# Patient Record
Sex: FEMALE | Race: WHITE | Employment: UNEMPLOYED | URBAN - METROPOLITAN AREA
[De-identification: names, ages, dates, MRNs, and addresses within clinical notes are randomized per-mention and may not be internally consistent; named-entity substitution may affect disease eponyms.]

---

## 2022-06-09 ENCOUNTER — OFFICE VISIT (OUTPATIENT)
Dept: ORTHOPEDIC SURGERY | Age: 22
End: 2022-06-09
Payer: OTHER GOVERNMENT

## 2022-06-09 ENCOUNTER — TELEPHONE (OUTPATIENT)
Dept: ORTHOPEDIC SURGERY | Age: 22
End: 2022-06-09

## 2022-06-09 VITALS
HEIGHT: 62 IN | WEIGHT: 173 LBS | RESPIRATION RATE: 16 BRPM | OXYGEN SATURATION: 98 % | HEART RATE: 100 BPM | BODY MASS INDEX: 31.83 KG/M2

## 2022-06-09 DIAGNOSIS — M76.892 HIP FLEXOR TENDINITIS, LEFT: Primary | ICD-10-CM

## 2022-06-09 DIAGNOSIS — M99.03 LUMBAR REGION SOMATIC DYSFUNCTION: ICD-10-CM

## 2022-06-09 DIAGNOSIS — G57.03 PIRIFORMIS SYNDROME OF BOTH SIDES: ICD-10-CM

## 2022-06-09 DIAGNOSIS — M76.31 ILIOTIBIAL BAND SYNDROME OF BOTH SIDES: ICD-10-CM

## 2022-06-09 DIAGNOSIS — M99.06 LOWER LIMB REGION SOMATIC DYSFUNCTION: ICD-10-CM

## 2022-06-09 DIAGNOSIS — M76.891 HIP FLEXOR TENDINITIS, RIGHT: ICD-10-CM

## 2022-06-09 DIAGNOSIS — M99.07 UPPER EXTREMITY SOMATIC DYSFUNCTION: ICD-10-CM

## 2022-06-09 DIAGNOSIS — M99.09 SOMATIC DYSFUNCTION OF ABDOMINAL REGION: ICD-10-CM

## 2022-06-09 DIAGNOSIS — M99.02 THORACIC REGION SOMATIC DYSFUNCTION: ICD-10-CM

## 2022-06-09 DIAGNOSIS — M99.05 PELVIC SOMATIC DYSFUNCTION: ICD-10-CM

## 2022-06-09 DIAGNOSIS — M99.01 CERVICAL SOMATIC DYSFUNCTION: ICD-10-CM

## 2022-06-09 DIAGNOSIS — M76.32 ILIOTIBIAL BAND SYNDROME OF BOTH SIDES: ICD-10-CM

## 2022-06-09 DIAGNOSIS — M99.08 RIB CAGE REGION SOMATIC DYSFUNCTION: ICD-10-CM

## 2022-06-09 DIAGNOSIS — M99.04 SACRAL REGION SOMATIC DYSFUNCTION: ICD-10-CM

## 2022-06-09 PROCEDURE — 98929 OSTEOPATH MANJ 9-10 REGIONS: CPT | Performed by: FAMILY MEDICINE

## 2022-06-09 PROCEDURE — 99204 OFFICE O/P NEW MOD 45 MIN: CPT | Performed by: FAMILY MEDICINE

## 2022-06-09 RX ORDER — SERTRALINE HYDROCHLORIDE 25 MG/1
TABLET, FILM COATED ORAL
COMMUNITY
Start: 2022-04-26

## 2022-06-09 RX ORDER — DICLOFENAC SODIUM 50 MG/1
50 TABLET, DELAYED RELEASE ORAL 2 TIMES DAILY WITH MEALS
Qty: 60 TABLET | Refills: 1 | Status: SHIPPED | OUTPATIENT
Start: 2022-06-09 | End: 2022-08-11 | Stop reason: SDUPTHER

## 2022-06-09 NOTE — TELEPHONE ENCOUNTER
Yohana from [employer] called stating an authorization is needed prior to being seen for physical therapy. Contact number [phone].

## 2022-06-09 NOTE — LETTER
6/9/2022    Patient: Julita Retana   YOB: 2000   Date of Visit: 6/9/2022     Pradip Ryan NP  [facility] [phone]  Via Fax: [phone]    Dear Pradip Ryan NP,      Thank you for referring Ms. Julita Retana to [place] [address]. for evaluation. My notes for this consultation are attached. If you have questions, please do not hesitate to call me. I look forward to following your patient along with you.       Sincerely,    Mirian Velasco, DO

## 2022-06-09 NOTE — LETTER
NOTIFICATION RETURN TO WORK / SCHOOL    6/9/2022 10:58 AM    [phone]      To Whom It May Concern:    Allyson Corona is currently under the care of [facility]..  Genaro Lucas her with her at [facility] today and he will return to work/school on: 6/9/2022. If there are questions or concerns please have the patient contact our office.         Sincerely,      Iker Bearden, DO

## 2022-06-09 NOTE — PROGRESS NOTES
HISTORY OF PRESENT ILLNESS    Denys Baeza 2000 is a 25y.o. year old female comes in today as new patient for: pain hips    Patients symptoms have been present for 2-3 months. Pain level 7/10 hips lateral to posterior low back. It has worsened with walk/stand long periods. Patient has tried:  Walking with cane and PT referral but not started yeat. Has also been stretch massage often. Tylenol, ibuprofen OTC no help. It is described as pain in hips ache, usually not sharp. No popping. IMAGING: MRI hips 5/10/2022 MRI-CT images reviewed, agree w/ findings, but doubt labral tears after exam:  1. Bilateral anterior superior acetabular labral tears. 2.  Mild subchondral sclerosis at the bilateral femoral heads, likely within normal limits. No evidence of osteonecrosis. Past Surgical History:   Procedure Laterality Date    HX APPENDECTOMY       Social History     Socioeconomic History    Marital status: SINGLE   Tobacco Use    Smoking status: Never Smoker    Smokeless tobacco: Never Used   Vaping Use    Vaping Use: Every day    Substances: Nicotine, Flavoring    Devices: Refillable tank   Substance and Sexual Activity    Alcohol use: Not Currently    Drug use: Yes     Types: Marijuana      Current Outpatient Medications   Medication Sig Dispense Refill    sertraline (ZOLOFT) 25 mg tablet        Past Medical History:   Diagnosis Date    Anxiety     Depression     Seizures ([address]      No family history on file. ROS:  No numb, tingle, incot, fever      Objective:  Pulse 100   Resp 16   Ht 5' 2\" (1.575 m)   Wt 173 lb (78.5 kg)   SpO2 98%   BMI 31.64 kg/m²   NEURO:  Sensation intact to light touch. Reflexes +2/4 patellar and Achilles bilaterally. M/S: Examined standing and supine. Slump negative. Standing flexion test positive right  Sphinx positive left.    ASIS low left  Iliac crests equal bilaterally Pubes equal bilaterally Medial malleolus low right  Sacral base posterior left ALLIE low left  TTA at C3 on left worse flexion, T2, 4, 5 on right worse flexion and L2, 4 on left worse flexion Rib(s) 2, 3, 4 leftTTP and posterior Thoracic diaphragm restricted right. Scapula motion restricted w/ TTA left. Hip flexion limited left. LE Strength +5/5 bilaterally Scour negative bilateral  ELENI negative bilateral .  Internal rotation normal. bilaterally  positive TTP over greater trochanter. Piriformis tender and tight left>right  Cindy's test positive bilateral  Flavio test positive for hip flexor and quad bilateral         Assessment/Plan:     ICD-10-CM ICD-9-CM    1. Hip flexor tendinitis, left  M76.892 727.09 REFERRAL TO PHYSICAL THERAPY      diclofenac EC (VOLTAREN) 50 mg EC tablet   2. Hip flexor tendinitis, right  M76.891 727.09 REFERRAL TO PHYSICAL THERAPY      diclofenac EC (VOLTAREN) 50 mg EC tablet   3. Iliotibial band syndrome of both sides  M76.31 728.89 REFERRAL TO PHYSICAL THERAPY    M76.32  diclofenac EC (VOLTAREN) 50 mg EC tablet   4. Piriformis syndrome of both sides  G57.03 355.0 REFERRAL TO PHYSICAL THERAPY      diclofenac EC (VOLTAREN) 50 mg EC tablet   5. Lumbar region somatic dysfunction  M99.03 739.3 NV OSTEOPATHIC MANIP,9-10 BODY REGN   6. Pelvic somatic dysfunction  M99.05 739.5 NV OSTEOPATHIC MANIP,9-10 BODY REGN   7. Sacral region somatic dysfunction  M99.04 739.4 NV OSTEOPATHIC MANIP,9-10 BODY REGN   8. Thoracic region somatic dysfunction  M99.02 739.2 NV OSTEOPATHIC MANIP,9-10 BODY REGN   9. Rib cage region somatic dysfunction  M99.08 739.8 NV OSTEOPATHIC MANIP,9-10 BODY REGN   10. Cervical somatic dysfunction  M99.01 739.1 NV OSTEOPATHIC MANIP,9-10 BODY REGN   11.  Upper extremity somatic dysfunction  M99.07 739.7 NV OSTEOPATHIC MANIP,9-10 BODY REGN   12. Lower limb region somatic dysfunction  M99.06 739.6 NV OSTEOPATHIC MANIP,9-10 BODY REGN   13. Somatic dysfunction of abdominal region  M99.09 739.9 NV OSTEOPATHIC MANIP,9-10 BODY REGN         Patient (or guardian if minor) verbalizes understanding of evaluation and plan. Verbal consent obtained. Cervical, Thoracic, Rib, Lumbar, Pelvic, Sacral, Upper Ext, Lower Ext and Abdominal SD treated with myofascial and ME. Correction of previous malalignments verified after Tx. Pt tolerated well. Notes improvement of Sx and pain is now rated 5/10. HEP/stretches daily. Discussed stretching/strengthening/posture. Will start HEP, PT and Rx for diclofenac 50mg as above and plan follow-up 4 weeks.  Highly doubt labral tears as exam no evidence

## 2022-06-16 ENCOUNTER — HOSPITAL ENCOUNTER (OUTPATIENT)
Dept: PHYSICAL THERAPY | Age: 22
Discharge: HOME | End: 2022-06-16
Payer: OTHER GOVERNMENT

## 2022-06-16 PROCEDURE — 97162 PT EVAL MOD COMPLEX 30 MIN: CPT

## 2022-06-16 NOTE — THERAPY EVALUATION
[address] [place] [address] PHYSICAL THERAPY  [address] [place] [address] Gwendolyn Bernal, [address] - Phone: [phone]  Fax: [phone] / [address] [facility]  Patient Name: Lyric Del Toro : 2000   Medical   Diagnosis: Left hip pain [M25.552]  Right hip pain [M25.551] Treatment Diagnosis: Bilateral hip labral tear   Onset Date: 3-4 months ago     Referral Source: Hossein Fuentes* Start of Care [facility]): 2022   Prior Hospitalization: See medical history Provider #: [id number]   Prior Level of Function: functionally independent    Comorbidities: Anxiety, depression, epilepsy, coccyx removal    Medications: Verified on Patient Summary List   The Plan of Care and following information is based on the information from the initial evaluation.   ===========================================================================================  Assessment / key information: Patient is a 25 y.o. female CC bilateral hip pain that wraps around to her lower back for the past 3-4 months. Pt states she has noticed hip discomfort for most of her life, but it was not bad enough to receive medical attention. Pt's MRI showed bilateral hip labral tears and CT scan revealed possible AVN. Six years ago, pt had her coccyx removed after multiple fractures. Pt reports she did not follow her post-surgical protocol and did not receive any type of rehabilitation, which is a possible contributor to pt's current hip dysfunction along with her advance activity level throughout high school. Throughout high school, pt participated in multiple sports including shot put, discus, and wrestling. Pt recenlty has been ambulating with a SPC PRN, specifically with ascending/descending stairs, due to the pain. Pt reports most difficulty with standing and walking (~15-20 minutes) and ascending/descneding stairs. Pt demonstrates ROM deficits (listed below).  Pt's strength is 5/5 bilaterally but painful. (Pt  will benefit from physical therapist management to address her impairments (listed below),  educate her, and improve her level of function. Thanks for your referral.     ROM/Strength                   AROM                 Strength (1-5)  Hip Left Right Left Right   Flexion 82 p! 75 p! 5/5 p! 5/5 p! Extension 26 p! 24 p! 5/5 p! 5/5 p! Abduction 60 p! 42 p! 5/5 p! 5/5 p! ER Supine 19 25 5/5 5/5   IR Supine 25 p! 31 p! 5/5 p! 5/5 p! ER Prone 35 43 5/5 5/5   IR Prone 53 p! 41 p! 5/5 p! 5/5 p!    Knee Left Right Left Right   Extension NT NT 5/5 5/5   Flexion NT NT 5/5 5/5       ===========================================================================================  Eval Complexity: History: MEDIUM  Complexity : 1-2 comorbidities / personal factors will impact the outcome/ POC Exam:HIGH Complexity : 4+ Standardized tests and measures addressing body structure, function, activity limitation and / or participation in recreation  Presentation: MEDIUM Complexity : Evolving with changing characteristics  Clinical Decision Making:MEDIUM Complexity : FOTO score of 26-74Overall Complexity:MEDIUM  Problem List: pain affecting function, decrease ROM, decrease strength, edema affecting function, impaired gait/ balance, decrease ADL/ functional abilitiies, decrease activity tolerance, decrease flexibility/ joint mobility and decrease transfer abilities  FOTO score: 59 indicating 41% functional disability  Treatment Plan may include any combination of the following: Therapeutic exercise, Therapeutic activities, Neuromuscular re-education, Physical agent/modality, Gait/balance training, Manual therapy, Patient education, Self Care training, Functional mobility training, Home safety training and Stair training  Patient / Family readiness to learn indicated by: asking questions, trying to perform skills and interest  Persons(s) to be included in education: patient (P)  Barriers to Learning/Limitations: None  Measures taken: n/a   Patient Goal (s): \"Find out what exactly is going on and solve it so I can go back to living a normal life. \"   Patient self reported health status: fair  Rehabilitation Potential: good   Short Term Goals: To be accomplished in  3-4  weeks:  1. Patient to be adherent to HEP to facilitate pain control with ADL's.  2. Patient to demonstrate pain-free bilateral AROM hip flexion to >/= 90 degrees to facilitate ease with squatting to lift objects. 3. Patient to demonstrate >/= 50% improvement in sleep tolerance uninterrupted by bilateral hip pain.  Long Term Goals: To be accomplished in  6-8  weeks:  1. Patient to be Safe and Independent with HEP to self-manage/prevent symptoms after DC. 2. Patient to increase FS FOTO score to > 66 to indicate increased functional independence. 3. Patient to demonstrate uninterrupted standing activity tolerance >/= 30' to return to recreational activities. 4.  Patient to demonstrate safe stair negotiation in reciprocal pattern without UE assist or AD. 5. Patient to ambulate >500' without AD to facilitate community mobility. Frequency / Duration:   Patient to be seen  2  times per week for 4-6  weeks:  Patient / Caregiver education and instruction: self care, activity modification and exercises. We reviewed our facility's Patient Personal Responsibilities (PPR) form, particularly in regards to compliance towards her appointment time, our attendance policy, and her home exercise program. Patient was informed of possible discharge for non-compliance to our attendance policy per PPR form. We also discussed her POC as deemed appropriate by the treating therapist and physician. Patient verbalized understanding that she must show objective and functional improvement in an appropriate time frame.  Patient verbalized understanding that should progress or compliance be lacking, we will contact the referring physician for further consultation to address and attempt to establish alternate treatment strategies as necessary and/or possibly discharge. Therapist Signature: Jasmin Dam \"BJ\" Massiel Cali DPT, Cert. MDT, Cert. DN, Cert. SMT, Dip. Osteopractic Date: 2/00/9782   Certification Period: 6/16/22-9/15/22 Time: 3:12 PM   ===========================================================================================  I certify that the above Physical Therapy Services are being furnished while the patient is under my care. I agree with the treatment plan and certify that this therapy is necessary. Physician Signature:        Date:       Time:                                         Christin Troy*   Please sign and return to [employer] or you may fax the signed copy to [phone]. Thank you.

## 2022-06-16 NOTE — THERAPY EVALUATION
PHYSICAL THERAPY - DAILY TREATMENT NOTE  Patient Name: Denys Baeza        Date: 2022  : 2000   [x]  Patient  Verified  Visit #:   1     Insurance: Payor: SUSHMA / Plan: Devin Fox [address] / Product Type:  /      In time:   1:25          Out time:   2:10 Total Treatment Time (min):   45     TREATMENT AREA = Left hip pain [M25.552]  Right hip pain [M25.551]    SUBJECTIVE    Pain Level (on 0 to 10 scale):  7   10   Medication Changes/New allergies or changes in medical history, any new surgeries or procedures? []  No    []  Yes   If yes, update Summary List:    Subjective Functional Status/Changes:  []  No changes reported   HISTORY    Present Symptoms/complaints: Pt CC bilateral hip pain and LBP. Pt states there is a \"belt of pain through my pelvis, hips, and back. \" Pt describes pain as an achy feeling that occasionally pulsates. Pt reports she has noticed hip discomfort for most of her life, but recently in the past 3-4 months the pain has gotten worse. Pt received an MRI which showed bilateral hip labral tears. CT scan revealed possible AVN. 6 years ago, pt had her coccyx removed for which she did not receive PT and did not follow her surgical protocol of sitting on the donut. Pt reported participating in multiple sports throughout childhood such as shot put, discus, and wrestling. Worse pain: 10/10, best pain: 2/10, current pain: 7/10. Aggravating factors: walking and standing (15-20 minutes), climbing stairs. Pt states she uses a SPC occasionally, specifically when ascending/descending stairs. Pt's bedroom is on the second floor. Alleviating factors: heating pad, stretching, and massaging the area.     Present since: 3-4 months ago   [] Improving []  Unchanging []  Worsening            Disturbed night: [] No    [x] Yes    Pain at rest: [] No    [x] Yes     Pt reports difficulty falling asleep; wakes up about 3-5 times a night due to pain; uses a body pillow Treatments this episode:     Previous episodes:    Previous treatment:      Spinal history:    Paraesthesia: [x] No    [] Yes     General Health:  [x] Good []  Fair []  Poor     Imaging:   [x] Yes []  No     Summary:    [] Acute []  Sub-acute [x]  Chronic     [] Trauma []  Insidious onset       Work:  Mechanical Stresses:    Leisure: Mechanical Stresses: fishing, standing, walking, ascedning/descending stairs        OBJECTIVE    Gait:  [] Normal    [] Abnormal    [] Antalgic    [] NWB    Device: Pt ambulates with bilateral LE's ER       Neuro Screening  Myotome Level Muscles Nerve Reflex Sensation Action   L1-L3 Iliopsoas T12/L1-3  Quadriceps L2-4  Adductors L2-L4 (Iliacus)- Femoral  Femoral  Obturator/Sciatic N/A  L3-4 = Patella L1- Inguinal Crease  L2- Anterior Thigh  L3- Anterior Thigh above knee Hip Flexion  Knee Extension   L4 Tibialis anterior L4&5   Deep Peroneal Patella Anterior Knee Suprapatellar Ankle Dorsiflexion   L5 Extensor Hallucis Longus  Extensor Digitorum Lungus  Gluteus Medius Deep Peroneal         Superior Gluteal None Reliable Dorsum of Foot/Great Toe  Anterior Shank Extensor  to Great Toe        Hip Internal Rotation   S1 Peroneus Longus  Gastrocnemius & Soleus  Gluteus Des Superficial Peroneal  Tibial    Inferior Gluteal Achilles Tendon Lateral Shank around Lateral Malleolus  Lateral Aspect/Dorsum of GT   Plantar Flexion      Hip Extension   Notable findings from above:          ROM/Strength        AROM               Strength (1-5)  Hip Left Right Left Right   Flexion 82 p! 75 p! 5/5 p! 5/5 p! Extension 26 p! 24 p! 5/5 p! 5/5 p! Abduction 60 p! 42 p! 5/5 p! 5/5 p! ER Supine 19 25 5/5 5/5   IR Supine 25 p! 31 p! 5/5 p! 5/5 p! ER Prone 35 43 5/5 5/5   IR Prone 53 p! 41 p! 5/5 p! 5/5 p!    Knee Left Right Left Right   Extension NT NT 5/5 5/5   Flexion NT NT 5/5 5/5                                      Optional Tests  David/ Marino Test: [x] Neg    [] Pos  Flavio Test:  [x] Neg [] Pos  OberTest:   [x] Neg    [] Pos    Other tests/ comments:    Therapeutic Procedures:  Min Procedure Specifics + Rationale   n/a [x]  Patient Education (performed throughout session) [x] Review HEP    [] Progressed/Changed HEP based on:   [] proper performance and advancement of Therex/TA   [] reduction in pain level    [] increased functional capacity       [] change in directional preference    [x] Therapeutic Exercise   [x]  See Flowsheet   Rationale: increase ROM and increase strength to improve the patients ability to participate in ADL's     []  Manual Therapy [] STM/DTM     [] IASTM     [] Scar Massage  [] Cross friction       [] PNF         [] PROM    [] TDN (see objective; actual needle insertion time not billed)   [] Joint mobilization: Gr I [] II []  III [] IV[] V[]:    Rationale: decrease pain, increase ROM, increase tissue extensibility, decrease trigger points and increase postural awareness to attain functional use and participation with ADL's    [x] Therapeutic Activity   [x]  See Flowsheet;     Rationale:  To improve safety, proprioception, coordination, and efficiency with tasks    [x] Neuromuscular Re-ed   [x]  See Flowsheet;    Rationale: increase ROM, increase strength, improve coordination, improve balance and increase proprioception  to improve the patients ability to safely participate in ADL's     Modality rationale: decrease inflammation, decrease pain, increase tissue extensibility and increase muscle contraction/control to improve the patients ability to perform ADL's with greater ease         Post Treatment Pain Level (on 0 to 10) scale:   7  / 10     ASSESSMENT    Assessment/Changes in Function:     Justification for Eval Code Complexity:  Patient History (low 0, mod 1-2, high 3-4): med  Examination (low 1-2, mod 3+, high 4+): high  Clinical Presentation (low stable or uncomplicated, mod evolving or changing, high unstable or unpredictable): med  Clinical Decision Making (low , mod 26-74, high 1-25): FOTO med      []  See Progress Note/Recertification   Patient will continue to benefit from skilled PT services to modify and progress therapeutic interventions, address functional mobility deficits, address ROM deficits, address strength deficits, analyze and address soft tissue restrictions, analyze and cue movement patterns, analyze and modify body mechanics/ergonomics, assess and modify postural abnormalities and instruct in home and community integration  to attain remaining goals   Progress toward goals / Updated goals:    See POC     PLAN    [x]  Upgrade activities as tolerated YES Continue plan of care   []  Discharge due to :    []  Other:      Therapist: Michaela Cochran \"BJ\" Brigitte Allen, DPT, Cert. MDT, Cert. DN, Cert. SMT, Dip.  Osteopractic    Date: 6/16/2022 Time: 1:25 PM     Future Appointments   Date Time Provider Jack Delgado   7/7/2022  3:30 PM DO JOAQUIN Watts AMB

## 2022-06-20 ENCOUNTER — HOSPITAL ENCOUNTER (OUTPATIENT)
Dept: PHYSICAL THERAPY | Age: 22
Discharge: HOME | End: 2022-06-20
Payer: OTHER GOVERNMENT

## 2022-06-20 PROCEDURE — 97110 THERAPEUTIC EXERCISES: CPT

## 2022-06-20 NOTE — PROGRESS NOTES
PHYSICAL THERAPY - DAILY TREATMENT NOTE    Patient Name: Osmin Avery        Date: 2022  : 2000   YES Patient  Verified  Visit #:   1     Insurance: Payor: SUSHMA / Plan: Devin Fox 74 / Product Type:  /      In time: 10:16 Out time: 11:03   Total Treatment Time: 47     TREATMENT AREA = Left hip pain [M25.552]  Right hip pain [M25.551]    SUBJECTIVE    Pain Level (on 0 to 10 scale):  5-6  / 10   Medication Changes/New allergies or changes in medical history, any new surgeries or procedures? NO    If yes, update Summary List   Subjective Functional Status/Changes:  []  No changes reported     \"I feel the same since the first day. I just kind of have the pain all the time so I'm used to it. \"          OBJECTIVE    Therapeutic Procedures:  Min Procedure Specifics + Rationale   n/a [x]  Patient Education (performed throughout session) [x] Review HEP    [] Progressed/Changed HEP based on:   [] proper performance and advancement of Therex/TA   [] reduction in pain level    [] increased functional capacity       [] change in directional preference   47 [x] Therapeutic Exercise   [x]  See Flowsheet   Rationale: increase ROM and increase strength to improve the patients ability to participate in ADL's      Modality rationale: decrease inflammation, decrease pain, increase tissue extensibility and increase muscle contraction/control to improve the patients ability to perform ADL's with greater ease         Other Objective/Functional Measures:    Initiated therex/treatment protocol per flow sheet.      Minor VC's and demo required throughout session for proper form and safety     Educated patient on the likelihood of her past activities (shotput/discus throwing, wrestling, and coccyx fx/removal without proper rehab) contributed to her current condition     Post Treatment Pain Level (on 0 to 10) scale:   3- 10     ASSESSMENT    Assessment/Changes in Function:     Pt tolerated session well without increase in pain other than fatigue/deconditioning throughout session. Pt reports pain with end range hip IR bilaterally. Patient will continue to benefit from skilled PT services to modify and progress therapeutic interventions, address functional mobility deficits, address ROM deficits, address strength deficits, analyze and address soft tissue restrictions, analyze and cue movement patterns, analyze and modify body mechanics/ergonomics, assess and modify postural abnormalities and instruct in home and community integration  to attain remaining goals   Progress toward goals / Updated goals:    1st session since initial eval, no significant progress noted in return to function. Pt given HEP. PLAN    [x]  Upgrade activities as tolerated  [x]  Update interventions per flow sheet YES Continue plan of care   []  Discharge due to :    []  Other:      Therapist: Francisco Bates \"BJ\" WAN Strickland, Cert. MDT, Cert. DN, Cert. SMT, Dip.  Osteopractic    Date: 6/20/2022 Time: 10:23 AM     Future Appointments   Date Time Provider Jack Delgado   6/22/2022  9:30 AM Randa Umana, PT [facility]   6/28/2022  2:00 PM [address] [facility]   7/1/2022 11:45 AM Randa Umana, PT [facility]   7/5/2022 10:15 AM Renetta Prieto PT [facility]   7/7/2022 10:15 AM Renetta Prieto, PT [facility]   7/7/2022  3:30 PM DO JOAQUIN [place] AMB

## 2022-06-22 ENCOUNTER — HOSPITAL ENCOUNTER (OUTPATIENT)
Dept: PHYSICAL THERAPY | Age: 22
Discharge: HOME | End: 2022-06-22
Payer: OTHER GOVERNMENT

## 2022-06-22 PROCEDURE — 97110 THERAPEUTIC EXERCISES: CPT

## 2022-06-22 PROCEDURE — 97140 MANUAL THERAPY 1/> REGIONS: CPT

## 2022-06-22 NOTE — PROGRESS NOTES
PHYSICAL THERAPY - DAILY TREATMENT NOTE    Patient Name: Dajuan Reyes        Date: 2022  : 2000   YES Patient  Verified  Visit #:   3   of     Insurance: Payor: SUSHMA / Plan: Devin Fox 74 / Product Type:  /      In time: 9:30 Out time: 10:18   Total Treatment Time: 48     Medicare/BCBS [place] Time Tracking (below)   Total Timed Codes (min):  48 1:1 Treatment Time:  48     TREATMENT AREA =   Left hip pain [M25.552]  Right hip pain [M25.551]    SUBJECTIVE    Pain Level (on 0 to 10 scale):  -7  / 10   Medication Changes/New allergies or changes in medical history, any new surgeries or procedures? NO    If yes, update Summary List   Subjective Functional Status/Changes:  []  No changes reported     Pt reports feeling looser after last visit, some soreness/mild pain noted throughout the remainder of the day. \"Today's a rough day, I did a little too much yesterday. \"         OBJECTIVE    30 min Therapeutic Exercise:  [x]  See flow sheet   Rationale:      increase ROM and increase strength to improve the patients ability to perform ADL's with greater ease. 18 min Manual Therapy: See flow sheet  AP mobs and lateral glides bilateral hips to address mobility deficits    TPR bilat piriformis sidelying, mild manual pressure    Rationale:      decrease pain, increase ROM, increase tissue extensibility and decrease trigger points to improve patient's ability to perform functional mobility with less compensation and pain. The manual therapy interventions were performed at a separate and distinct time from the therapeutic activities interventions       min Patient Education:  YES  Reviewed HEP   []  Progressed/Changed HEP based on:   Cont with      Other Objective/Functional Measures:    Increased pain bilateral groin with LTR, eliminated and had pt complete windshield wipers instead which eliminated pain.       Mini squats at wall using swiss ball, hips ER to avoid pinching at hips. Able to squat to approx 70* hip flexion without pain     Manual techniques incorporated to address tissue and joint mobility deficits, most relief with TPR to bilat piriformis. Post Treatment Pain Level (on 0 to 10) scale:   4-5  / 10     ASSESSMENT    Assessment/Changes in Function:     Pt with increased TTP and tension at L>R piriformis muscle. Pain in hips with active  forward flexion of trunk     []  See Progress Note/Recertification   Patient will continue to benefit from skilled PT services to analyze, cue, progress, modify,, demonstrate, instruct, and address, movement patterns, therapeutic interventions, postural abnormalities, soft tissue restrictions, ROM, strength, functional mobility, body mechanics/ergonomics, and home and community integration, to attain remaining goals. Progress toward goals / Updated goals: · Short Term Goals: To be accomplished in  3-4  weeks:  1. Patient to be adherent to HEP to facilitate pain control with ADL's.  2. Patient to demonstrate pain-free bilateral AROM hip flexion to >/= 90 degrees to facilitate ease with squatting to lift objects. 3. Patient to demonstrate >/= 50% improvement in sleep tolerance uninterrupted by bilateral hip pain.         PLAN    []  Upgrade activities as tolerated YES Continue plan of care   []  Discharge due to :    []  Other:      Therapist: Hayden Hogan PT, DPT    Date: 6/22/2022 Time: 8:39 AM     Future Appointments   Date Time Provider Jack Delgado   6/22/2022  9:30 AM Michael Fortune PT [facility]   6/28/2022  2:00 PM [address] [facility]   7/1/2022 11:45 AM Michael Fortune PT [facility]   7/5/2022 10:15 AM Karlos Hansen PT [facility]   7/7/2022 10:15 AM Karlos Hansen PT [facility]   7/7/2022  3:30 PM DO JOAQUIN Squires BS AMB

## 2022-06-28 ENCOUNTER — HOSPITAL ENCOUNTER (OUTPATIENT)
Dept: PHYSICAL THERAPY | Age: 22
Discharge: HOME | End: 2022-06-28
Payer: OTHER GOVERNMENT

## 2022-06-28 PROCEDURE — 97140 MANUAL THERAPY 1/> REGIONS: CPT

## 2022-06-28 PROCEDURE — 97110 THERAPEUTIC EXERCISES: CPT

## 2022-06-28 NOTE — PROGRESS NOTES
PHYSICAL THERAPY - DAILY TREATMENT NOTE    Patient Name: Leatha Goodpasture        Date: 2022  : 2000   YES Patient  Verified  Visit #:   4   of     Insurance: Payor: SUSHMA / Plan: Devin Fox 74 / Product Type:  /      In time: 210 Out time: 300   Total Treatment Time: 50     Medicare/BCBS [place] Time Tracking (below)   Total Timed Codes (min):  50 1:1 Treatment Time:  50     TREATMENT AREA =   Left hip pain [M25.552]  Right hip pain [M25.551]    SUBJECTIVE    Pain Level (on 0 to 10 scale):  5-6  / 10   Medication Changes/New allergies or changes in medical history, any new surgeries or procedures? NO    If yes, update Summary List   Subjective Functional Status/Changes:  []  No changes reported   Pt reports lower level of pain on B later hips, pain remains in B pelvis and B L/spine. Pt states that she has pain around tailbone area, with palpation, it's sore and aching cramping pulsing after standing or sitting for a long time. Pt enquires about prolonged corticosteroid use for atopic dermatitis possibly causing tissue degeneration in B hip labrum. OBJECTIVE    42 min Therapeutic Exercise:  [x]  See flow sheet   Rationale:      increase ROM and increase strength to improve the patients ability to perform ADL's with greater ease. 8 min Manual Therapy: See flow sheet   TPR bilat piriformis, prone   Rationale:      decrease pain, increase ROM, increase tissue extensibility and decrease trigger points to improve patient's ability to perform functional mobility with less compensation and pain. The manual therapy interventions were performed at a separate and distinct time from the therapeutic activities interventions       min Patient Education:  YES  Reviewed HEP   []  Progressed/Changed HEP based on:   Cont with      Other Objective/Functional Measures:  Pt Ed.  Regarding long term corticosteroid use and hip musculature   Post Treatment Pain Level (on 0 to 10) scale:   7 / 10     ASSESSMENT    Assessment/Changes in Function:   Pt with increased TTP and tension at L>R piriformis muscle. Pain in hips with active  forward flexion of trunk. []  See Progress Note/Recertification   Patient will continue to benefit from skilled PT services to analyze, cue, progress, modify,, demonstrate, instruct, and address, movement patterns, therapeutic interventions, postural abnormalities, soft tissue restrictions, ROM, strength, functional mobility, body mechanics/ergonomics, and home and community integration, to attain remaining goals. Progress toward goals / Updated goals: · Short Term Goals: To be accomplished in  3-4  weeks:  1. Patient to be adherent to HEP to facilitate pain control with ADL's. Pt states compliance with soreness afterwards (6/28/22)  2. Patient to demonstrate pain-free bilateral AROM hip flexion to >/= 90 degrees to facilitate ease with squatting to lift objects. 3. Patient to demonstrate >/= 50% improvement in sleep tolerance uninterrupted by bilateral hip pain. Current: 25% improvement in sleep tolerance, \"has gotten a little better\".  (6/28/22)       PLAN    [x]  Upgrade activities as tolerated YES Continue plan of care   []  Discharge due to :    []  Other:      Therapist: KATELYNN Matta    Date: 6/28/2022 Time: 3:19 PM     Future Appointments   Date Time Provider Jack Delgado   6/28/2022  2:00 PM [address] [facility]   7/1/2022 11:45 AM Domi Mejia PT [facility]   7/5/2022 10:15 AM Jerzy Holliday PT [facility]   7/7/2022 10:15 AM Jerzy Holliday PT [facility]   7/7/2022  3:30 PM DO JOAQUIN Matthew AMB

## 2022-07-01 ENCOUNTER — HOSPITAL ENCOUNTER (OUTPATIENT)
Dept: PHYSICAL THERAPY | Age: 22
End: 2022-07-01
Payer: OTHER GOVERNMENT

## 2022-07-07 ENCOUNTER — HOSPITAL ENCOUNTER (OUTPATIENT)
Dept: PHYSICAL THERAPY | Age: 22
Discharge: HOME | End: 2022-07-07
Payer: OTHER GOVERNMENT

## 2022-07-07 PROCEDURE — 97530 THERAPEUTIC ACTIVITIES: CPT

## 2022-07-07 PROCEDURE — 97110 THERAPEUTIC EXERCISES: CPT
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
## 2022-07-12 ENCOUNTER — HOSPITAL ENCOUNTER (OUTPATIENT)
Dept: PHYSICAL THERAPY | Age: 22
End: 2022-07-12
Payer: OTHER GOVERNMENT

## 2022-08-01 ENCOUNTER — HOSPITAL ENCOUNTER (OUTPATIENT)
Dept: PHYSICAL THERAPY | Age: 22
Discharge: HOME | End: 2022-08-01
Payer: OTHER GOVERNMENT

## 2022-08-01 PROCEDURE — 97530 THERAPEUTIC ACTIVITIES: CPT

## 2022-08-01 PROCEDURE — 97112 NEUROMUSCULAR REEDUCATION: CPT

## 2022-08-01 PROCEDURE — 97110 THERAPEUTIC EXERCISES: CPT

## 2022-08-01 NOTE — INTERDISCIPLINARY ROUNDS
PHYSICAL THERAPY - DAILY TREATMENT NOTE    Patient Name: Nash Sullivan        Date: 2022  : 2000   YES Patient  Verified  Visit #:     Insurance: Payor: SUSHMA / Plan: Devin Fox 74 / Product Type:  /      In time: 12:40 Out time: 1:29   Total Treatment Time: 49     TREATMENT AREA = Left hip pain [M25.552]  Right hip pain [M25.551]    SUBJECTIVE    Pain Level (on 0 to 10 scale):  7  / 10   Medication Changes/New allergies or changes in medical history, any new surgeries or procedures? NO    If yes, update Summary List   Subjective Functional Status/Changes:  []  No changes reported     \"I've been doing my exercises. My back has been killing me. I'm sure the travel to [place] didn't help. My hips feel like they want to cramp up. \"   \"I've noticed the strength exercises have gotten easier. My mom had stronger bands and I got them from her. OBJECTIVE    Therapeutic Procedures:  Min Procedure Specifics + Rationale   n/a [x]  Patient Education (performed throughout session) [x] Review HEP    [] Progressed/Changed HEP based on:   [] proper performance and advancement of Therex/TA   [] reduction in pain level    [] increased functional capacity       [] change in directional preference   23 [x] Therapeutic Exercise   [x]  See Flowsheet   Rationale: increase ROM and increase strength to improve the patients ability to participate in ADL's    8 [x] Therapeutic Activity   [x]  See Flowsheet  Posture, positioning, and transfers  Rationale: To improve safety, proprioception, coordination, and efficiency with tasks   8 [x] Neuromuscular Re-ed   [x]  See Flowsheet  Repeated movements per MDT.    Rationale: increase ROM, increase strength, improve coordination, improve balance and increase proprioception  to improve the patients ability to safely participate in ADL's     Modality rationale: decrease inflammation, decrease pain, increase tissue extensibility and increase muscle contraction/control to improve the patients ability to perform ADL's with greater ease     Min Type Additional Details   10 [x]  Heat         [] pre-TATO      [x] post-TATO Location:L/S    [] supine             [] prone     [] legs elevated  [] legs flat  [] sitting              [] sidelying - [] left [] right   [x] Skin assessment post-treatment:  [x]intact [x]redness- no adverse reaction       []redness - adverse reaction:     Other Objective/Functional Measures:    Cramping sensation occurs towards end of sets of therex. Discussed with patient that this is the norm/expectation with exercise progression. Patient offered trial of estim. When therapist returned, patient changed her mind due to concerns in where the pads would be placed and modesty. Patient opted for MHP instead     Post Treatment Pain Level (on 0 to 10) scale:   3  / 10     ASSESSMENT    Assessment/Changes in Function:     Performed new therex without increase in symptoms         Patient will continue to benefit from skilled PT services to modify and progress therapeutic interventions, address functional mobility deficits, address ROM deficits, address strength deficits, analyze and address soft tissue restrictions, analyze and cue movement patterns, analyze and modify body mechanics/ergonomics, and instruct in home and community integration  to attain remaining goals   Progress toward goals / Updated goals:    1st session since progress note. No significant progress observed       PLAN    [x]  Upgrade activities as tolerated  [x]  Update interventions per flow sheet NO Continue plan of care   []  Discharge due to :    []  Other:      Therapist: Ulises Hansen \"BJ\" WAN Strickland, Cert. MDT, Cert. DN, Cert. SMT, Dip.  Osteopractic    Date: 8/1/2022 Time: 12:42 PM     Future Appointments   Date Time Provider Jack Delgado   8/3/2022 12:30 PM Caty Marin PT [facility]   8/8/2022 10:40 AM DO JOAQUIN Puentes BS AMB   8/8/2022 12:30 PM Caty Marin PT [facility]   8/10/2022 12:30 PM Diana Lewis PT [facility]       6

## 2022-08-03 ENCOUNTER — APPOINTMENT (OUTPATIENT)
Dept: PHYSICAL THERAPY | Age: 22
End: 2022-08-03
Payer: OTHER GOVERNMENT

## 2022-08-08 ENCOUNTER — HOSPITAL ENCOUNTER (OUTPATIENT)
Dept: PHYSICAL THERAPY | Age: 22
Discharge: HOME | End: 2022-08-08
Payer: OTHER GOVERNMENT

## 2022-08-08 PROCEDURE — 97110 THERAPEUTIC EXERCISES: CPT

## 2022-08-08 PROCEDURE — 97112 NEUROMUSCULAR REEDUCATION: CPT

## 2022-08-08 PROCEDURE — 97530 THERAPEUTIC ACTIVITIES: CPT

## 2022-08-08 PROCEDURE — 97116 GAIT TRAINING THERAPY: CPT

## 2022-08-08 NOTE — PROGRESS NOTES
PHYSICAL THERAPY - DAILY TREATMENT NOTE    Patient Name: Gertrudis Galarza        Date: 2022  : 2000   YES Patient  Verified  Visit #:     Insurance: Payor: SUSHMA / Plan: Devin Fox 74 / Product Type:  /      In time: 12:25 Out time: 1:38   Total Treatment Time: 63     TREATMENT AREA = Left hip pain [M25.552]  Right hip pain [M25.551]    SUBJECTIVE    Pain Level (on 0 to 10 scale):  -  / 10   Medication Changes/New allergies or changes in medical history, any new surgeries or procedures? NO    If yes, update Summary List   Subjective Functional Status/Changes:  []  No changes reported     \"My back is still sore. The pain in my hips are more towards the front (pelvis)\"          OBJECTIVE    Therapeutic Procedures:  Min Procedure Specifics + Rationale   n/a [x]  Patient Education (performed throughout session) [x] Review HEP    [] Progressed/Changed HEP based on:   [] proper performance and advancement of Therex/TA   [] reduction in pain level    [] increased functional capacity       [] change in directional preference   14 [x] Therapeutic Exercise   [x]  See Flowsheet   Rationale: increase ROM and increase strength to improve the patients ability to participate in ADL's    8 [x] Therapeutic Activity   [x]  See Flowsheet    Rationale:  To improve safety, proprioception, coordination, and efficiency with tasks   23 [x] Neuromuscular Re-ed   [x]  See Flowsheet  Repeated movements per MDT  NICOLAS Johnson    Rationale: increase ROM, increase strength, improve coordination, improve balance and increase proprioception  to improve the patients ability to safely participate in ADL's   8 [x]  Gait Training HK (banded), Banded SS, Monster Walk  Rationale: Normalize gait, increase proprioceptive and kinesthetic awareness, coordination, balance     Modality rationale: decrease inflammation, decrease pain, increase tissue extensibility and increase muscle contraction/control to improve the patients ability to perform ADL's with greater ease     Min Type Additional Details   10 [x]  Heat         [] pre-TATO      [x] post-TATO Location:L/S    [x] supine             [] prone     [x] legs elevated  [] legs flat  [] sitting              [] sidelying - [] left [] right   10 [x]  Cold Pack   [] pre-TAOT      [x] post-TATO  []  Ice massage Location:anterior pelvis    [x] supine             [] prone  [x] legs elevated  [] legs flat  [] sitting              [] sidelying - [] left [] right   [x] Skin assessment post-treatment:  [x]intact [x]redness- no adverse reaction       []redness - adverse reaction:     Other Objective/Functional Measures:    Discussed with patient women's health therapy due to her report of anterior pelvic pain. Added and advanced therex per flow sheet. Expresses unfamiliarity with several therex performed previously, requiring VC's     Post Treatment Pain Level (on 0 to 10) scale:   7 / 10     ASSESSMENT    Assessment/Changes in Function:     No worsening of symptoms, able to perform despite high level of pain reported. Patient will continue to benefit from skilled PT services to modify and progress therapeutic interventions, address functional mobility deficits, address ROM deficits, address strength deficits, analyze and address soft tissue restrictions, analyze and cue movement patterns, analyze and modify body mechanics/ergonomics, and instruct in home and community integration  to attain remaining goals   Progress toward goals / Updated goals:    Limited progress due to lack of consistency in attendance     PLAN    [x]  Upgrade activities as tolerated  [x]  Update interventions per flow sheet YES Continue plan of care   []  Discharge due to :    []  Other:      Therapist: Chantelle Sylvester \"JOSE G\" WAN Strickland, Cert. MDT, Cert. JULIUS, Cert. SMT, Dip.  Osteopractic    Date: 8/8/2022 Time: 12:37 PM     Future Appointments   Date Time Provider Jack Delgado   8/10/2022 12:30 PM Loyda Nielsen, PT [facility]   8/11/2022  1:15 PM DO JOAQUIN Lam BS AMB

## 2022-08-10 ENCOUNTER — APPOINTMENT (OUTPATIENT)
Dept: PHYSICAL THERAPY | Age: 22
End: 2022-08-10
Payer: OTHER GOVERNMENT

## 2022-08-10 NOTE — PROGRESS NOTES
[facility] PHYSICAL THERAPY  [address] [place] [address] Yamilex Bernal, [address] [employer] - Phone: [phone]  Fax: [phone] SUMMARY  Patient Name: Luna Borrero : 2000   Treatment/Medical Diagnosis: Left hip pain [M25.552]  Right hip pain [M25.551]   Referral Source: Aristides Tse     Date of Initial Visit: 22 Attended Visits: 7 Missed Visits: 4     SUMMARY OF TREATMENT  Patient's POC has consisted of therex, therapeutic activities, manual therapy prn, modalities prn, pt. education, and a comprehensive HEP. Treatment strategies used to address functional mobility deficits, ROM deficits, strength deficits, analyze and address soft tissue restrictions, analyze and cue movement patterns, analyze and modify body mechanics/ergonomics, assess and modify postural abnormalities and instruct in home and community integration. CURRENT STATUS    Patient attended only 2 more treatment sessions since PN sent on 22. She missed 3 weeks of treatment due to a family emergency that required her to be out of town. Upon return, she missed 2 more scheduled sessions. Patient reported LBP and worsening of her hip pain during her trip to [place] despite HEP compliance. Treatment sessions were inconsistent in providing relief, at times lowering pain to 3/10 and others having no affect at all. Relief provided appears to be temporary at best. Patient declined estim modality as she believed \"it would be weird having it so low where it hurts\" (Lumbosacral region). GOALS/MEASURE OF PROGRESS Goal Met? 1. Patient to be adherent to HEP to facilitate pain control with ADL's.  2. Patient to demonstrate >/= 50% improvement in sleep tolerance uninterrupted by bilateral hip pain  3. Patient to be Safe and Independent with HEP to self-manage/prevent symptoms after DC.   4. Patient to increase FS FOTO score to > 66 to indicate increased functional independence. 5. Patient to demonstrate uninterrupted standing activity tolerance >/= 30' to return to recreational activities. 6.  Patient to demonstrate safe stair negotiation in reciprocal pattern without UE assist or AD. MET    NO      MET      Did not present to be re-assessed      MET      MET     RECOMMENDATIONS  Discontinue therapy due to lack of appreciable progress towards goals and inconsistency in attendance. If you have any questions/comments please contact us directly at [phone]. Thank you for allowing us to assist in the care of your patient. Therapist Signature: Bridget Barnes \"BJ\" Matheus Morrow, WAN, Cert. MDT, Cert. DN, Cert. SMT, Dip. Osteopractic Date: 8/10/22   Reporting Period: N/a     Certification Period N/a Time: 11:32 AM     NOTE TO PHYSICIAN:  PLEASE COMPLETE THE ORDERS BELOW AND FAX TO   [facility] Physical Therapy: [phone]  If you are unable to process this request in 24 hours please contact our office: [phone]    ___ I have read the above report and request that my patient continue as recommended.   ___ I have read the above report and request that my patient continue therapy with the following changes/special instructions:_________________________________________________________   ___ I have read the above report and request that my patient be discharged from therapy.      Physician Signature:        Date:     Time:

## 2022-08-11 ENCOUNTER — OFFICE VISIT (OUTPATIENT)
Dept: ORTHOPEDIC SURGERY | Age: 22
End: 2022-08-11
Payer: OTHER GOVERNMENT

## 2022-08-11 VITALS
WEIGHT: 168 LBS | HEART RATE: 77 BPM | BODY MASS INDEX: 30.91 KG/M2 | RESPIRATION RATE: 14 BRPM | OXYGEN SATURATION: 99 % | HEIGHT: 62 IN

## 2022-08-11 DIAGNOSIS — M76.31 ILIOTIBIAL BAND SYNDROME OF BOTH SIDES: ICD-10-CM

## 2022-08-11 DIAGNOSIS — M76.892 HIP FLEXOR TENDINITIS, LEFT: Primary | ICD-10-CM

## 2022-08-11 DIAGNOSIS — M99.04 SACRAL REGION SOMATIC DYSFUNCTION: ICD-10-CM

## 2022-08-11 DIAGNOSIS — G57.03 PIRIFORMIS SYNDROME OF BOTH SIDES: ICD-10-CM

## 2022-08-11 DIAGNOSIS — M99.07 UPPER EXTREMITY SOMATIC DYSFUNCTION: ICD-10-CM

## 2022-08-11 DIAGNOSIS — M76.891 HIP FLEXOR TENDINITIS, RIGHT: ICD-10-CM

## 2022-08-11 DIAGNOSIS — M76.32 ILIOTIBIAL BAND SYNDROME OF BOTH SIDES: ICD-10-CM

## 2022-08-11 DIAGNOSIS — M99.03 LUMBAR REGION SOMATIC DYSFUNCTION: ICD-10-CM

## 2022-08-11 DIAGNOSIS — M99.05 PELVIC SOMATIC DYSFUNCTION: ICD-10-CM

## 2022-08-11 DIAGNOSIS — M99.08 RIB CAGE REGION SOMATIC DYSFUNCTION: ICD-10-CM

## 2022-08-11 DIAGNOSIS — M99.09 SOMATIC DYSFUNCTION OF ABDOMINAL REGION: ICD-10-CM

## 2022-08-11 DIAGNOSIS — M99.06 LOWER LIMB REGION SOMATIC DYSFUNCTION: ICD-10-CM

## 2022-08-11 DIAGNOSIS — M99.02 THORACIC REGION SOMATIC DYSFUNCTION: ICD-10-CM

## 2022-08-11 DIAGNOSIS — M99.01 CERVICAL SOMATIC DYSFUNCTION: ICD-10-CM

## 2022-08-11 PROCEDURE — 98929 OSTEOPATH MANJ 9-10 REGIONS: CPT | Performed by: FAMILY MEDICINE

## 2022-08-11 PROCEDURE — 99214 OFFICE O/P EST MOD 30 MIN: CPT | Performed by: FAMILY MEDICINE

## 2022-08-11 RX ORDER — DICLOFENAC SODIUM 50 MG/1
50 TABLET, DELAYED RELEASE ORAL 2 TIMES DAILY WITH MEALS
Qty: 60 TABLET | Refills: 1 | Status: SHIPPED | OUTPATIENT
Start: 2022-08-11

## 2022-08-11 RX ORDER — PRAZOSIN HYDROCHLORIDE 1 MG/1
CAPSULE ORAL
COMMUNITY

## 2022-08-11 NOTE — PROGRESS NOTES
HISTORY OF PRESENT ILLNESS    Barrie Howard 2000 is a 25y.o. year old female comes in today to be evaluated and treated for: pain hips    Since last appt has noticed pain is better with some PT but mostly HEP daily. Had to return home to [place] so that made PT difficult. Pain level 5/10. Using voltaren 50mg without benefit. IMAGING: MRI hips 5/10/2022 MRI-CT images reviewed, agree w/ findings, but doubt labral tears after exam:  1. Bilateral anterior superior acetabular labral tears. 2.  Mild subchondral sclerosis at the bilateral femoral heads, likely within normal limits. No evidence of osteonecrosis. Past Surgical History:   Procedure Laterality Date    HX APPENDECTOMY      HX WISDOM TEETH EXTRACTION      2 weeks ago     Social History     Socioeconomic History    Marital status: SINGLE   Tobacco Use    Smoking status: Never    Smokeless tobacco: Never   Vaping Use    Vaping Use: Every day    Substances: Nicotine, Flavoring    Devices: Refillable tank   Substance and Sexual Activity    Alcohol use: Not Currently    Drug use: Yes     Types: Marijuana     Current Outpatient Medications   Medication Sig Dispense Refill    prazosin (MINIPRESS) 1 mg capsule Take  by mouth nightly. sertraline (ZOLOFT) 25 mg tablet       diclofenac EC (VOLTAREN) 50 mg EC tablet Take 1 Tablet by mouth two (2) times daily (with meals). (Patient not taking: Reported on 8/11/2022) 60 Tablet 1     Past Medical History:   Diagnosis Date    Anxiety     Depression     Seizures ([address]      History reviewed. No pertinent family history. ROS:  No numb, tingle, incot, fever    Objective:  Pulse 77   Resp 14   Ht 5' 2\" (1.575 m)   Wt 168 lb (76.2 kg)   SpO2 99%   BMI 30.73 kg/m²   NEURO:  Sensation intact to light touch. Reflexes +2/4 patellar and Achilles bilaterally. M/S: Examined standing and supine. Slump negative. Standing flexion test positive right  Sphinx positive left.    ASIS low left  Iliac crests equal bilaterally Pubes equal bilaterally Medial malleolus low right  Sacral base posterior left  ALLIE low left  TTA at C3, 5 on left worse flexion, T4, 5 on right worse flexion and L2, 4 on left worse flexion Rib(s) 4 left TTP and posterior Thoracic diaphragm restricted right. Scapula motion restricted w/ TTA left. Hip flexion limited left. LE Strength +5/5 bilaterally Scour negative bilateral  ELENI negative bilateral .  Internal rotation normal. bilaterally  less TTP over greater trochanter. Piriformis tender and tight left>right  Cindy's test minimal bilateral  Flavio test positive for hip flexor and quad bilateral       Assessment/Plan:     ICD-10-CM ICD-9-CM    1. Hip flexor tendinitis, left  M76.892 727.09 REFERRAL TO PHYSICAL THERAPY      diclofenac EC (VOLTAREN) 50 mg EC tablet      2. Hip flexor tendinitis, right  M76.891 727.09 REFERRAL TO PHYSICAL THERAPY      diclofenac EC (VOLTAREN) 50 mg EC tablet      3. Iliotibial band syndrome of both sides  M76.31 728.89 REFERRAL TO PHYSICAL THERAPY    M76.32  diclofenac EC (VOLTAREN) 50 mg EC tablet      4. Piriformis syndrome of both sides  G57.03 355.0 REFERRAL TO PHYSICAL THERAPY      diclofenac EC (VOLTAREN) 50 mg EC tablet      5. Lumbar region somatic dysfunction  M99.03 739.3 VT OSTEOPATHIC MANIP,9-10 BODY REGN      6. Pelvic somatic dysfunction  M99.05 739.5 VT OSTEOPATHIC MANIP,9-10 BODY REGN      7. Sacral region somatic dysfunction  M99.04 739.4 VT OSTEOPATHIC MANIP,9-10 BODY REGN      8. Thoracic region somatic dysfunction  M99.02 739.2 VT OSTEOPATHIC MANIP,9-10 BODY REGN      9. Rib cage region somatic dysfunction  M99.08 739.8 VT OSTEOPATHIC MANIP,9-10 BODY REGN      10. Cervical somatic dysfunction  M99.01 739.1 VT OSTEOPATHIC MANIP,9-10 BODY REGN      11. Upper extremity somatic dysfunction  M99.07 739.7 VT OSTEOPATHIC MANIP,9-10 BODY REGN      12. Lower limb region somatic dysfunction  M99.06 739.6 VT OSTEOPATHIC MANIP,9-10 BODY REGN      13. Somatic dysfunction of abdominal region  M99.09 739.9 [address]          Patient (or guardian if minor) verbalizes understanding of evaluation and plan. Verbal consent obtained. Cervical, Thoracic, Rib, Lumbar, Pelvic, Sacral, Upper Ext, Lower Ext, and Abdominal SD treated with myofascial and ME. Correction of previous malalignments verified after Tx. Pt tolerated well. Notes improvement of Sx and pain is now rated 2/10. HEP/stretches daily. Discussed stretching/strengthening/posture. Will start PT again w/ HEP and discussed need stretch multiple/day especially after exercises and resend voltaren 50mg and F/U 6 weeks. Declines surgeon eval potential labral tears. Total time spent on encounter including chart/imaging/lab review and evaluation/documentation/demo home program/coordination of care/form completion but not including time for any procedures/manipulation 32 minutes.

## 2022-08-11 NOTE — LETTER
8/11/2022    Patient: Alona Murphy   YOB: 2000   Date of Visit: 8/11/2022     Kin Lo NP  [address] [id number]  Via Fax: [phone]    Dear Kin Lo NP,      Thank you for referring Ms. Alona Murphy to [place] [address]. for evaluation. My notes for this consultation are attached. If you have questions, please do not hesitate to call me. I look forward to following your patient along with you.       Sincerely,    Thad Greene, DO

## 2022-08-23 ENCOUNTER — APPOINTMENT (OUTPATIENT)
Dept: PHYSICAL THERAPY | Age: 22
End: 2022-08-23
Payer: OTHER GOVERNMENT

## 2022-08-26 ENCOUNTER — APPOINTMENT (OUTPATIENT)
Dept: PHYSICAL THERAPY | Age: 22
End: 2022-08-26
Payer: OTHER GOVERNMENT